# Patient Record
(demographics unavailable — no encounter records)

---

## 2024-11-26 NOTE — REVIEW OF SYSTEMS
[Vision Problems] : vision problems [Palpitations] : palpitations [Nausea] : nausea [Headache] : headache [Dizziness] : dizziness [Confusion] : confusion [Memory Loss] : memory loss [Negative] : Heme/Lymph

## 2024-12-01 NOTE — HISTORY OF PRESENT ILLNESS
[FreeTextEntry1] : Dizziness/Blurry vision [de-identified] : Patient is a 20 year old woman with PMH of asthma, headaches who presents today for follow up. Patient describes blurry vision sometimes as an aura before a migraine other times just on its own. Patient describes the blurry vision as "hazy" not exactly clear.  Patient feels dizzy during these episodes and feels her head is "vibrating" and gets a head tremor. It resolves itself after a couple minutes after sitting down. Patient has felt orthostatic hypotension in the past. Patient has photophobia. Migraines are located frontal and top of head, patient also has tightness of head and neck muscles. Patient has nausea with these episodes the dizziness she describes as dots in her field of vision and feels off balance.  Patient tried topiramate before she started to get migraine, but hasnt helped her too much.  Patient states that her headache gets worse with lying flat.   Patient has episodes when she goes on her phone at night or during the day while walking in a crowded store. Patient has these episodes 5 times a week. These episodes have been happening for over a year.  Patient has decreased water intake.

## 2024-12-01 NOTE — HISTORY OF PRESENT ILLNESS
[FreeTextEntry1] : Dizziness/Blurry vision [de-identified] : Patient is a 20 year old woman with PMH of asthma, headaches who presents today for follow up. Patient describes blurry vision sometimes as an aura before a migraine other times just on its own. Patient describes the blurry vision as "hazy" not exactly clear.  Patient feels dizzy during these episodes and feels her head is "vibrating" and gets a head tremor. It resolves itself after a couple minutes after sitting down. Patient has felt orthostatic hypotension in the past. Patient has photophobia. Migraines are located frontal and top of head, patient also has tightness of head and neck muscles. Patient has nausea with these episodes the dizziness she describes as dots in her field of vision and feels off balance.  Patient tried topiramate before she started to get migraine, but hasnt helped her too much.  Patient states that her headache gets worse with lying flat.   Patient has episodes when she goes on her phone at night or during the day while walking in a crowded store. Patient has these episodes 5 times a week. These episodes have been happening for over a year.  Patient has decreased water intake.

## 2025-03-11 NOTE — HISTORY OF PRESENT ILLNESS
[FreeTextEntry1] : Ms. Adan is a 20 y/o F with a PMHx of asthma and migraines who presents to establish care with neurology. The patient notes that her migraines started 3-4 years ago and she was managing without medication for a long time but recently started medication through her PCP d/t increased severity and frequency of the HA. She notes that the headaches occur usually in the mornings when she wakes up and looks at her phone. They begin with a prodrome of nausea and blurred vision, both of which continue throughout the duration of the HA. The migraines were occurring every morning at first, but now have largely stopped since she started working night shift (she continues to have blurred vision and nausea); however, at the same time she began night shift, she also started taking nortriptyline prescribed by her PCP. She also notes intermittent blepharospasm even without HA. The patient describes the HA as occurring in the center back of the head radiating down through the entire c-spine, and the pain is pressure-like. She also notes a sharp, stabbing pain in the head intermittently even without headache. The headaches last 3-4 hours then stop on their own, usually either with sleep or when she finally vomits.  The patient has tried sumatriptan, but notes that it does not usually work. She does state that she only ever takes one dose and does not take a second dose if the headache does not go away. The patient notes that she has fewer ha during her menstrual cycle. She denies any recent illness or hospitalization. The patient notes that she has had headaches most of her life, often stress induced, but the migraines started 3-4 years ago.

## 2025-03-11 NOTE — ASSESSMENT
[FreeTextEntry1] : Patient is a 20 y/o F with a PMHx of asthma and headaches who presents today to establish care with neurology for migraine headache. Given the improvement in her headaches since starting the nortryptiline, and no reported side effects, the medication is likely effective and should be continued. Given her young age, low concern for other underlying pathology, but should have a baseline of imaging given the severity and frequency of the migraines. Can also adjust the dosage of her abortive medication for more significant headache improvement.   Plan: - c/w nortryptiline 10mg PO q24 at bedtime - Increase sumatriptan dose to 50mg PO as needed for migraine - Obtain MRI brain for baseline neurologic imaging - RTC in 3 months  Discussed with Dr. Che

## 2025-03-11 NOTE — PHYSICAL EXAM
[FreeTextEntry1] :   Mental status: Awake, alert and oriented x3.  Recent and remote memory intact.  Naming, repetition and comprehension intact.  Attention/concentration intact.  No dysarthria, no aphasia   Cranial nerves: Pupils equally round and reactive to light, visual fields full, no nystagmus, extraocular muscles intact, V1 through V3 intact bilaterally and symmetric, face symmetric, hearing intact to finger rub, palate elevation symmetric, tongue was midline.   Motor:  MRC grading 5/5 b/l UE/LE.   strength 5/5.  Normal tone and bulk.  No abnormal movements.   Sensation: Intact to light touch, proprioception, temperature, vibration, and pinprick   Coordination: No dysmetria on finger-to-nose and heel-to-shin.  No dysdiadochokinesia.   Reflexes: 2+ in bilateral UE/LE, downgoing toes bilaterally. (-) Merino.   Gait: Narrow and steady. No ataxia.  Romberg negative

## 2025-03-11 NOTE — HISTORY OF PRESENT ILLNESS
[FreeTextEntry1] : Ms. Adan is a 22 y/o F with a PMHx of asthma and migraines who presents to establish care with neurology. The patient notes that her migraines started 3-4 years ago and she was managing without medication for a long time but recently started medication through her PCP d/t increased severity and frequency of the HA. She notes that the headaches occur usually in the mornings when she wakes up and looks at her phone. They begin with a prodrome of nausea and blurred vision, both of which continue throughout the duration of the HA. The migraines were occurring every morning at first, but now have largely stopped since she started working night shift (she continues to have blurred vision and nausea); however, at the same time she began night shift, she also started taking nortriptyline prescribed by her PCP. She also notes intermittent blepharospasm even without HA. The patient describes the HA as occurring in the center back of the head radiating down through the entire c-spine, and the pain is pressure-like. She also notes a sharp, stabbing pain in the head intermittently even without headache. The headaches last 3-4 hours then stop on their own, usually either with sleep or when she finally vomits.  The patient has tried sumatriptan, but notes that it does not usually work. She does state that she only ever takes one dose and does not take a second dose if the headache does not go away. The patient notes that she has fewer ha during her menstrual cycle. She denies any recent illness or hospitalization. The patient notes that she has had headaches most of her life, often stress induced, but the migraines started 3-4 years ago.

## 2025-03-11 NOTE — ASSESSMENT
[FreeTextEntry1] : Patient is a 22 y/o F with a PMHx of asthma and headaches who presents today to establish care with neurology for migraine headache. Given the improvement in her headaches since starting the nortryptiline, and no reported side effects, the medication is likely effective and should be continued. Given her young age, low concern for other underlying pathology, but should have a baseline of imaging given the severity and frequency of the migraines. Can also adjust the dosage of her abortive medication for more significant headache improvement.   Plan: - c/w nortryptiline 10mg PO q24 at bedtime - Increase sumatriptan dose to 50mg PO as needed for migraine - Obtain MRI brain for baseline neurologic imaging - RTC in 3 months  Discussed with Dr. Che

## 2025-04-11 NOTE — HISTORY OF PRESENT ILLNESS
[FreeTextEntry1] : Dizziness/Blurry vision [de-identified] : Patient is a 21 year old woman with PMH of asthma, migraine with aura who presents today for follow up.  Patient states that her headache has improved. She mentions that she does night work and sleeps around 4 hours during the day only. She also mentions that she feels weak and nauseous from time to time. Patient has decreased water intake.

## 2025-04-11 NOTE — ASSESSMENT
[FreeTextEntry1] : Patient is a 20 year old woman with PMH of asthma who comes in to follow up regarding blurry vision associated with migraines.   # Migraines with auras, blurry vision - 5x weekly -feeling dizziness with blurry vision in big crowds not always associated with migraine -Neuro Exam: non focal no nystagmus, does have postural migraines, increased pain with laying flat - start Nortyptyline 10 mg daily with Sumatriptan as needed for abortive -CTH non contrast ordered -Neuro referral -> MRI ordered  #Insomnia #Night time shift - Will send melatonin and benadryl for the patient to try both - Education provided on Sleep hygiene  # Asthma - follows with Dr. Ronal manzo - c/w symbicort BID and albuterol   # HCM -Flu vaccine 11/26/24 - will obtain repeat cbc, cmp, tsh, Feriitin, Vit D - HIV testing -Gynecology Referral - RTC in 4 months.

## 2025-07-18 NOTE — ASSESSMENT
[FreeTextEntry1] : 22 y/o woman with PMHx of asthma, migraine with aura presenting today for follow up.  # Asthma - she hasn't had any issues and takes the Symbicort inconsistently - follows with Dr. Ronal Boykin - c/w symbicort BID and albuterol  # Gastritis # Food getting stuck - will prescribe Famotidine PRN - GI referral  #Back pain - Muscle seemed tense in the thoracic paraspinal area - will prescribe robaxin, advised to avoid weights for 2 week, rest and ice  # Migraines with auras, blurry vision - 5x weekly -feeling dizziness with blurry vision in big crowds not always associated with migraine -Neuro Exam: non focal no nystagmus, does have postural migraines, increased pain with laying flat - started Nortyptyline 10 mg daily with Sumatriptan as needed for abortive - she hasn't required any recently -CTH non contrast and MRI Neuro referral provided on previous visits  # HCM - Flu vaccine 11/26/24 - Will give first shot of Gardasil today, come back in 2 months - f/u MMR titers - Gynecology Referral --> she has an appointment in December - RTC in 2 months

## 2025-07-18 NOTE — PHYSICAL EXAM
[No Acute Distress] : no acute distress [Well Nourished] : well nourished [No JVD] : no jugular venous distention [No Respiratory Distress] : no respiratory distress  [No Accessory Muscle Use] : no accessory muscle use [Clear to Auscultation] : lungs were clear to auscultation bilaterally [Normal Rate] : normal rate  [Regular Rhythm] : with a regular rhythm [Normal S1, S2] : normal S1 and S2 [No Edema] : there was no peripheral edema [Soft] : abdomen soft [Non Tender] : non-tender [Non-distended] : non-distended [No CVA Tenderness] : no CVA  tenderness [de-identified] : Mild tenderness when pressing on the throacic spinous processes

## 2025-07-18 NOTE — REVIEW OF SYSTEMS
[Abdominal Pain] : abdominal pain [Constipation] : constipation [Back Pain] : back pain [Fever] : no fever [Chills] : no chills [Discharge] : no discharge [Pain] : no pain [Chest Pain] : no chest pain [Palpitations] : no palpitations [Lower Ext Edema] : no lower extremity edema [Shortness Of Breath] : no shortness of breath [Wheezing] : no wheezing [Cough] : no cough [Nausea] : no nausea [Vomiting] : no vomiting [Incontinence] : no incontinence [Frequency] : no frequency [Muscle Pain] : no muscle pain [Itching] : no itching [Skin Rash] : no skin rash [FreeTextEntry7] : after meals

## 2025-07-18 NOTE — HISTORY OF PRESENT ILLNESS
[FreeTextEntry1] : 22 y/o female presenting for a follow up appointment [de-identified] : 20 y/o woman with PMHx of asthma, migraine with aura presenting today for follow up.  She is requesting proof of immunizations for college, specifically the MMR and she said she did not want to perform the Meningococcal vaccine.  Patient states that her headache has improved and that she hasn't needed the Sumatriptan for a while. She mentions that she does night work and sleeps around 4 hours during the day only.  She also mentions that she feels the food stuck in her stomach after eating. No vomiting, just discomfort with the impression that the food is not advancing. Constipation with sometimes pooping only once a week  Finally she reports that she has a mid-back pain around the thoracic spine irradiating anteriorly and to the left arm. 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